# Patient Record
Sex: FEMALE | Race: WHITE | Employment: OTHER | URBAN - METROPOLITAN AREA
[De-identification: names, ages, dates, MRNs, and addresses within clinical notes are randomized per-mention and may not be internally consistent; named-entity substitution may affect disease eponyms.]

---

## 2017-01-27 PROBLEM — N18.30 CKD (CHRONIC KIDNEY DISEASE) STAGE 3, GFR 30-59 ML/MIN (HCC): Status: ACTIVE | Noted: 2017-01-27

## 2017-01-27 PROBLEM — S22.000D THORACIC COMPRESSION FRACTURE, WITH ROUTINE HEALING, SUBSEQUENT ENCOUNTER: Status: ACTIVE | Noted: 2017-01-27

## 2017-01-27 PROCEDURE — 87077 CULTURE AEROBIC IDENTIFY: CPT | Performed by: INTERNAL MEDICINE

## 2017-01-27 PROCEDURE — 81001 URINALYSIS AUTO W/SCOPE: CPT | Performed by: INTERNAL MEDICINE

## 2017-01-27 PROCEDURE — 87186 SC STD MICRODIL/AGAR DIL: CPT | Performed by: INTERNAL MEDICINE

## 2017-01-27 PROCEDURE — 87086 URINE CULTURE/COLONY COUNT: CPT | Performed by: INTERNAL MEDICINE

## 2017-03-18 PROCEDURE — 87086 URINE CULTURE/COLONY COUNT: CPT | Performed by: PHYSICIAN ASSISTANT

## 2017-03-31 PROCEDURE — 87086 URINE CULTURE/COLONY COUNT: CPT | Performed by: INTERNAL MEDICINE

## 2017-04-27 RX ORDER — ACETAMINOPHEN 325 MG/1
500 TABLET ORAL EVERY 6 HOURS PRN
COMMUNITY

## 2017-04-27 RX ORDER — HYDROMORPHONE HYDROCHLORIDE 1 MG/ML
1 SOLUTION ORAL EVERY 4 HOURS PRN
COMMUNITY
End: 2018-09-13

## 2017-05-02 ENCOUNTER — ANESTHESIA EVENT (OUTPATIENT)
Dept: ENDOSCOPY | Facility: HOSPITAL | Age: 82
End: 2017-05-02
Payer: MEDICARE

## 2017-05-03 ENCOUNTER — SURGERY (OUTPATIENT)
Age: 82
End: 2017-05-03

## 2017-05-03 ENCOUNTER — ANESTHESIA (OUTPATIENT)
Dept: ENDOSCOPY | Facility: HOSPITAL | Age: 82
End: 2017-05-03
Payer: MEDICARE

## 2017-05-03 ENCOUNTER — HOSPITAL ENCOUNTER (OUTPATIENT)
Facility: HOSPITAL | Age: 82
Setting detail: HOSPITAL OUTPATIENT SURGERY
Discharge: HOME | End: 2017-05-03
Attending: INTERNAL MEDICINE | Admitting: INTERNAL MEDICINE
Payer: MEDICARE

## 2017-05-03 VITALS
WEIGHT: 152 LBS | TEMPERATURE: 98 F | HEIGHT: 63 IN | HEART RATE: 60 BPM | RESPIRATION RATE: 18 BRPM | OXYGEN SATURATION: 97 % | BODY MASS INDEX: 26.93 KG/M2 | SYSTOLIC BLOOD PRESSURE: 125 MMHG | DIASTOLIC BLOOD PRESSURE: 84 MMHG

## 2017-05-03 DIAGNOSIS — R19.5 ABNORMAL STOOLS: ICD-10-CM

## 2017-05-03 PROCEDURE — 0DBH8ZX EXCISION OF CECUM, VIA NATURAL OR ARTIFICIAL OPENING ENDOSCOPIC, DIAGNOSTIC: ICD-10-PCS | Performed by: INTERNAL MEDICINE

## 2017-05-03 PROCEDURE — 0DBK8ZX EXCISION OF ASCENDING COLON, VIA NATURAL OR ARTIFICIAL OPENING ENDOSCOPIC, DIAGNOSTIC: ICD-10-PCS | Performed by: INTERNAL MEDICINE

## 2017-05-03 PROCEDURE — 88305 TISSUE EXAM BY PATHOLOGIST: CPT | Performed by: INTERNAL MEDICINE

## 2017-05-03 PROCEDURE — 0DBL8ZX EXCISION OF TRANSVERSE COLON, VIA NATURAL OR ARTIFICIAL OPENING ENDOSCOPIC, DIAGNOSTIC: ICD-10-PCS | Performed by: INTERNAL MEDICINE

## 2017-05-03 RX ORDER — SODIUM CHLORIDE, SODIUM LACTATE, POTASSIUM CHLORIDE, CALCIUM CHLORIDE 600; 310; 30; 20 MG/100ML; MG/100ML; MG/100ML; MG/100ML
INJECTION, SOLUTION INTRAVENOUS CONTINUOUS
Status: DISCONTINUED | OUTPATIENT
Start: 2017-05-03 | End: 2017-05-03

## 2017-05-03 NOTE — ANESTHESIA POSTPROCEDURE EVALUATION
[facility] Dimple Plowman Patient Status:  Hospital Outpatient Surgery   Age/Gender 80year old female MRN [id number]   Location [address] [facility]. Attending Sisi Ortiz MD   Hosp Day # 0 PCP Velma Kennedy MD       Anesthesia Post

## 2017-05-03 NOTE — ANESTHESIA PREPROCEDURE EVALUATION
PRE-OP EVALUATION    Patient Name: Zaina Krause    Pre-op Diagnosis: Abnormal stools [R19.5]    Procedure(s):  COLONOSCOPY    Surgeon(s) and Role:     Rashel Gray MD - Primary    Pre-op vitals reviewed.   Temp: 97.7 °F (36.5 °C)  Pulse: 72  Re summary reviewed. Anesthetic Complications  (-) history of anesthetic complications         GI/Hepatic/Renal           (+) PUD  (+) chronic renal disease and CRI                   Cardiovascular    Negative cardiovascular ROS. ECG reviewed.   Exercise t Used    Alcohol Use: No       Drug Use: No     Available pre-op labs reviewed.                Airway      Mallampati: II  Mouth opening: 3 FB  TM distance: 4 - 6 cm  Neck ROM: full Cardiovascular    Cardiovascular exam normal.         Dental             Pul

## 2017-05-03 NOTE — OPERATIVE REPORT
[facility] GI Procedure Report    PATIENT NAME: Jack Brewer  : 10/16/1935  MRN: [id number]  DATE OF OPERATION: 5/3/2017    PROCEDURE PERFORMED: Colonoscopy with biopsy, hot snare polypectomy, cold snare polypectomy    SURGEON: Sandip Willoughby, snare.  6. Diverticulosis of the sigmoid was identified. 7. The colonic mucosa was otherwise normal appearing. 8. On retroflexion in the rectum, there were small internal hemorrhoids and no masses. IMPRESSION:   1. Colon polyps x 4  2.  [place] 81

## 2017-05-03 NOTE — H&P
GI PRE-OP H&P    CC: Abnormal stool test    HPI:  Indu Boucher is a 80year old female who is here for colonoscopy for follow up of abnormal stool test (Cologuard positive).    Past Medical History   Diagnosis Date   • HYPERLIPIDEMIA    • HYPERTENSION diverticulosis    OTHER SURGICAL HISTORY  12/23/07  [facility]    Comment EGD (melena): anastamotic ulcer    OTHER SURGICAL HISTORY  8/4/16    Comment Cystoscopy- Dr. Miky Michelle       No current outpatient prescriptions on file.   Allergies As of Date: 04/27/2017  Aimee deep/MAC

## 2017-07-11 PROBLEM — S86.891D SHIN SPLINTS, RIGHT, SUBSEQUENT ENCOUNTER: Status: ACTIVE | Noted: 2017-07-11

## 2017-07-11 PROBLEM — S86.892D SHIN SPLINTS, LEFT, SUBSEQUENT ENCOUNTER: Status: ACTIVE | Noted: 2017-07-11

## 2017-08-21 PROBLEM — I70.0 AORTIC ATHEROSCLEROSIS (HCC): Status: ACTIVE | Noted: 2017-08-21

## 2017-09-07 PROCEDURE — 88305 TISSUE EXAM BY PATHOLOGIST: CPT | Performed by: INTERNAL MEDICINE

## 2017-11-29 PROCEDURE — 82746 ASSAY OF FOLIC ACID SERUM: CPT | Performed by: OTHER

## 2017-11-29 PROCEDURE — 82607 VITAMIN B-12: CPT | Performed by: OTHER

## 2018-01-19 PROBLEM — S86.892D SHIN SPLINTS, LEFT, SUBSEQUENT ENCOUNTER: Status: RESOLVED | Noted: 2017-07-11 | Resolved: 2018-01-19

## 2018-01-19 PROBLEM — S22.000D THORACIC COMPRESSION FRACTURE, WITH ROUTINE HEALING, SUBSEQUENT ENCOUNTER: Status: RESOLVED | Noted: 2017-01-27 | Resolved: 2018-01-19

## 2018-01-19 PROBLEM — S86.891D SHIN SPLINTS, RIGHT, SUBSEQUENT ENCOUNTER: Status: RESOLVED | Noted: 2017-07-11 | Resolved: 2018-01-19

## 2018-01-19 PROBLEM — M48.54XD: Status: ACTIVE | Noted: 2018-01-19

## 2018-04-02 PROCEDURE — 87077 CULTURE AEROBIC IDENTIFY: CPT | Performed by: INTERNAL MEDICINE

## 2018-04-02 PROCEDURE — 81001 URINALYSIS AUTO W/SCOPE: CPT | Performed by: INTERNAL MEDICINE

## 2018-04-02 PROCEDURE — 87086 URINE CULTURE/COLONY COUNT: CPT | Performed by: INTERNAL MEDICINE

## 2018-04-02 PROCEDURE — 87186 SC STD MICRODIL/AGAR DIL: CPT | Performed by: INTERNAL MEDICINE

## 2018-05-01 PROBLEM — M54.50 ACUTE BILATERAL LOW BACK PAIN WITHOUT SCIATICA: Status: ACTIVE | Noted: 2018-05-01

## 2018-08-02 PROBLEM — M94.262 CHONDROMALACIA, LEFT KNEE: Status: ACTIVE | Noted: 2018-08-02

## 2018-08-16 PROBLEM — M25.562 LEFT KNEE PAIN, UNSPECIFIED CHRONICITY: Status: ACTIVE | Noted: 2018-08-16

## 2018-08-28 PROCEDURE — 87077 CULTURE AEROBIC IDENTIFY: CPT | Performed by: INTERNAL MEDICINE

## 2018-08-28 PROCEDURE — 87086 URINE CULTURE/COLONY COUNT: CPT | Performed by: INTERNAL MEDICINE

## 2018-08-28 PROCEDURE — 81001 URINALYSIS AUTO W/SCOPE: CPT | Performed by: INTERNAL MEDICINE

## 2018-08-28 PROCEDURE — 87186 SC STD MICRODIL/AGAR DIL: CPT | Performed by: INTERNAL MEDICINE

## 2018-09-10 PROBLEM — M17.12 PRIMARY OSTEOARTHRITIS OF LEFT KNEE: Status: ACTIVE | Noted: 2018-09-10

## 2018-09-10 PROCEDURE — 87086 URINE CULTURE/COLONY COUNT: CPT | Performed by: INTERNAL MEDICINE

## 2018-09-10 PROCEDURE — 81001 URINALYSIS AUTO W/SCOPE: CPT | Performed by: INTERNAL MEDICINE

## 2018-09-10 PROCEDURE — 87077 CULTURE AEROBIC IDENTIFY: CPT | Performed by: INTERNAL MEDICINE

## 2018-09-10 PROCEDURE — 87186 SC STD MICRODIL/AGAR DIL: CPT | Performed by: INTERNAL MEDICINE

## 2019-02-06 PROBLEM — M94.262 CHONDROMALACIA, LEFT KNEE: Status: RESOLVED | Noted: 2018-08-02 | Resolved: 2019-02-06

## 2019-02-06 PROBLEM — M54.50 ACUTE BILATERAL LOW BACK PAIN WITHOUT SCIATICA: Status: RESOLVED | Noted: 2018-05-01 | Resolved: 2019-02-06

## 2019-02-06 PROBLEM — M25.562 LEFT KNEE PAIN, UNSPECIFIED CHRONICITY: Status: RESOLVED | Noted: 2018-08-16 | Resolved: 2019-02-06

## 2019-03-11 PROCEDURE — 87086 URINE CULTURE/COLONY COUNT: CPT | Performed by: INTERNAL MEDICINE

## 2019-03-11 PROCEDURE — 87186 SC STD MICRODIL/AGAR DIL: CPT | Performed by: INTERNAL MEDICINE

## 2019-03-11 PROCEDURE — 87077 CULTURE AEROBIC IDENTIFY: CPT | Performed by: INTERNAL MEDICINE

## 2019-03-23 PROCEDURE — 87086 URINE CULTURE/COLONY COUNT: CPT | Performed by: INTERNAL MEDICINE

## 2019-03-23 PROCEDURE — 81001 URINALYSIS AUTO W/SCOPE: CPT | Performed by: INTERNAL MEDICINE

## 2019-03-27 PROBLEM — R39.15 URINARY URGENCY: Status: ACTIVE | Noted: 2019-03-27

## 2019-03-27 PROBLEM — N39.0 RECURRENT UTI: Status: ACTIVE | Noted: 2019-03-27

## 2019-03-27 PROBLEM — R35.1 NOCTURIA: Status: ACTIVE | Noted: 2019-03-27

## 2019-07-15 PROCEDURE — 87186 SC STD MICRODIL/AGAR DIL: CPT | Performed by: UROLOGY

## 2019-07-15 PROCEDURE — 87088 URINE BACTERIA CULTURE: CPT | Performed by: UROLOGY

## 2019-07-15 PROCEDURE — 81001 URINALYSIS AUTO W/SCOPE: CPT | Performed by: UROLOGY

## 2019-07-15 PROCEDURE — 87086 URINE CULTURE/COLONY COUNT: CPT | Performed by: UROLOGY

## 2019-07-24 PROCEDURE — 81001 URINALYSIS AUTO W/SCOPE: CPT | Performed by: UROLOGY

## 2019-07-24 PROCEDURE — 87086 URINE CULTURE/COLONY COUNT: CPT | Performed by: UROLOGY

## 2019-10-18 PROBLEM — M48.54XD: Status: RESOLVED | Noted: 2018-01-19 | Resolved: 2019-10-18

## 2020-03-18 PROBLEM — I47.1 SVT (SUPRAVENTRICULAR TACHYCARDIA) (HCC): Status: ACTIVE | Noted: 2020-03-18

## 2020-05-18 PROBLEM — M47.9 SPONDYLOSIS: Status: ACTIVE | Noted: 2018-04-20

## 2020-06-29 PROCEDURE — 88305 TISSUE EXAM BY PATHOLOGIST: CPT | Performed by: INTERNAL MEDICINE

## 2021-02-08 PROBLEM — I47.2 NSVT (NONSUSTAINED VENTRICULAR TACHYCARDIA) (HCC): Status: ACTIVE | Noted: 2021-02-08

## 2021-02-08 PROBLEM — I47.1 PAROXYSMAL ATRIAL TACHYCARDIA (HCC): Status: ACTIVE | Noted: 2020-03-18

## 2021-02-08 PROBLEM — N18.30 CKD (CHRONIC KIDNEY DISEASE) STAGE 3, GFR 30-59 ML/MIN (HCC): Status: RESOLVED | Noted: 2017-01-27 | Resolved: 2021-02-08

## 2021-02-08 PROBLEM — I47.1 SVT (SUPRAVENTRICULAR TACHYCARDIA) (HCC): Status: RESOLVED | Noted: 2020-03-18 | Resolved: 2021-02-08

## 2022-02-24 PROBLEM — E61.1 IRON DEFICIENCY: Status: ACTIVE | Noted: 2022-02-24

## 2022-04-09 ENCOUNTER — LAB ENCOUNTER (OUTPATIENT)
Dept: LAB | Age: 87
End: 2022-04-09
Attending: INTERNAL MEDICINE
Payer: MEDICARE

## 2022-04-09 DIAGNOSIS — K90.0 CELIAC DISEASE: ICD-10-CM

## 2022-04-09 DIAGNOSIS — E61.1 IRON DEFICIENCY: ICD-10-CM

## 2022-04-10 LAB — SARS-COV-2 RNA RESP QL NAA+PROBE: NOT DETECTED

## (undated) DEVICE — TRAP 4 CPTR CHMBR N EZ INLN

## (undated) DEVICE — FORCEP BIOPSY RJ4 LG CAP W/ND

## (undated) DEVICE — SNARE CAPTIFLEX MICRO-OVL OLY

## (undated) DEVICE — REM POLYHESIVE ADULT PATIENT RETURN ELECTRODE: Brand: VALLEYLAB

## (undated) NOTE — IP AVS SNAPSHOT
[facility] [place]  [address] [place], [address] ~ [phone]                Discharge Summary   5/3/2017    Eduard Delgado           Admission Information        Provider Department    5/3/2017 Bill Wood MD  Endoscopy [    ]    [    ]       D-MANNOSE OR        Take by mouth daily. [    ]    [    ]    [    ]    [    ]       DILAUDID 1 MG/ML Liqd   Generic drug:  HYDROmorphone HCl        Take 1 mg by mouth every 4 (four) hours as needed for Pain.       [    ]    [ - You may notice some rectal \"spotting\" (a little blood on the toilet tissue) for a day or two after the exam. This is normal.  - If you experience any rectal bleeding (not spotting), persistent tenderness or sharp severe abdominal pains, oral temperatur - If you have concerns related to behavioral health issues or thoughts of harming yourself, contact [address] [place] [address] at [phone].     - If you don’t have insurance, [facility] Castorena has partnered with [facility]